# Patient Record
Sex: MALE | Race: WHITE | NOT HISPANIC OR LATINO | Employment: FULL TIME | URBAN - METROPOLITAN AREA
[De-identification: names, ages, dates, MRNs, and addresses within clinical notes are randomized per-mention and may not be internally consistent; named-entity substitution may affect disease eponyms.]

---

## 2023-10-03 PROBLEM — F43.9 SITUATIONAL STRESS: Status: ACTIVE | Noted: 2021-05-18

## 2023-10-03 PROBLEM — M25.522 LEFT ELBOW PAIN: Status: ACTIVE | Noted: 2022-01-26

## 2023-10-03 PROBLEM — F90.9 ADULT ADHD: Status: ACTIVE | Noted: 2021-05-18

## 2023-10-03 PROBLEM — M25.562 ACUTE PAIN OF LEFT KNEE: Status: ACTIVE | Noted: 2020-03-12

## 2023-10-03 RX ORDER — LANOLIN ALCOHOL/MO/W.PET/CERES
1000 CREAM (GRAM) TOPICAL DAILY
COMMUNITY
Start: 2021-05-18

## 2023-10-03 RX ORDER — ASCORBIC ACID 500 MG
500 TABLET ORAL DAILY
COMMUNITY
Start: 2021-05-18

## 2023-10-03 RX ORDER — DEXTROAMPHETAMINE SACCHARATE, AMPHETAMINE ASPARTATE MONOHYDRATE, DEXTROAMPHETAMINE SULFATE, AMPHETAMINE SULFATE 9.375; 9.375; 9.375; 9.375 MG/1; MG/1; MG/1; MG/1
37.5 CAPSULE, EXTENDED RELEASE ORAL EVERY MORNING
COMMUNITY
Start: 2022-01-26

## 2023-10-09 ENCOUNTER — TELEMEDICINE (OUTPATIENT)
Dept: PRIMARY CARE | Facility: CLINIC | Age: 33
End: 2023-10-09
Payer: COMMERCIAL

## 2023-10-09 DIAGNOSIS — F90.0 ATTENTION DEFICIT HYPERACTIVITY DISORDER (ADHD), PREDOMINANTLY INATTENTIVE TYPE: Primary | ICD-10-CM

## 2023-10-09 PROCEDURE — 99213 OFFICE O/P EST LOW 20 MIN: CPT | Performed by: FAMILY MEDICINE

## 2023-10-09 RX ORDER — DEXTROAMPHETAMINE SACCHARATE, AMPHETAMINE ASPARTATE MONOHYDRATE, DEXTROAMPHETAMINE SULFATE AND AMPHETAMINE SULFATE 5; 5; 5; 5 MG/1; MG/1; MG/1; MG/1
20 CAPSULE, EXTENDED RELEASE ORAL EVERY MORNING
Qty: 30 CAPSULE | Refills: 0 | Status: SHIPPED | OUTPATIENT
Start: 2023-12-08 | End: 2024-01-07

## 2023-10-09 RX ORDER — DEXTROAMPHETAMINE SACCHARATE, AMPHETAMINE ASPARTATE MONOHYDRATE, DEXTROAMPHETAMINE SULFATE AND AMPHETAMINE SULFATE 5; 5; 5; 5 MG/1; MG/1; MG/1; MG/1
20 CAPSULE, EXTENDED RELEASE ORAL EVERY MORNING
Qty: 30 CAPSULE | Refills: 0 | Status: SHIPPED | OUTPATIENT
Start: 2023-11-08 | End: 2023-12-08

## 2023-10-09 RX ORDER — DEXTROAMPHETAMINE SACCHARATE, AMPHETAMINE ASPARTATE MONOHYDRATE, DEXTROAMPHETAMINE SULFATE AND AMPHETAMINE SULFATE 5; 5; 5; 5 MG/1; MG/1; MG/1; MG/1
20 CAPSULE, EXTENDED RELEASE ORAL EVERY MORNING
Qty: 30 CAPSULE | Refills: 0 | Status: SHIPPED | OUTPATIENT
Start: 2023-10-09 | End: 2023-11-08

## 2023-10-09 ASSESSMENT — ENCOUNTER SYMPTOMS
ABDOMINAL PAIN: 0
COUGH: 0
UNEXPECTED WEIGHT CHANGE: 0
HEADACHES: 0
ARTHRALGIAS: 0
AGITATION: 0
SHORTNESS OF BREATH: 0
CHEST TIGHTNESS: 0
WEAKNESS: 0

## 2023-10-09 NOTE — PROGRESS NOTES
Subjective   Patient ID: Peter Masterson is a 32 y.o. male who presents for No chief complaint on file..  HPI  Pt is calling on video telehealth to report on the new med for his focus.  He had been on Mydayvis but switched to Adderall Xr 20.  He is happy with the focus.  Sleep and appetite fine   no questions      Review of Systems   Constitutional:  Negative for unexpected weight change.   Respiratory:  Negative for cough, chest tightness and shortness of breath.    Cardiovascular:  Negative for chest pain.   Gastrointestinal:  Negative for abdominal pain.   Musculoskeletal:  Negative for arthralgias.   Skin:  Negative for rash.   Neurological:  Negative for weakness and headaches.   Psychiatric/Behavioral:  Negative for agitation.        Objective   Physical Exam  No exam/telehealth    Assessment/Plan   Diagnoses and all orders for this visit:  Attention deficit hyperactivity disorder (ADHD), predominantly inattentive type  -     amphetamine-dextroamphetamine XR (Adderall XR) 20 mg 24 hr capsule; Take 1 capsule (20 mg) by mouth once daily in the morning. Do not crush or chew.  -     amphetamine-dextroamphetamine XR (Adderall XR) 20 mg 24 hr capsule; Take 1 capsule (20 mg) by mouth once daily in the morning. Do not crush or chew. Do not start before November 8, 2023.  -     amphetamine-dextroamphetamine XR (Adderall XR) 20 mg 24 hr capsule; Take 1 capsule (20 mg) by mouth once daily in the morning. Do not crush or chew. Do not start before December 8, 2023.

## 2024-01-10 ASSESSMENT — ENCOUNTER SYMPTOMS
CONSTITUTIONAL NEGATIVE: 1
RESPIRATORY NEGATIVE: 1
CARDIOVASCULAR NEGATIVE: 1

## 2024-01-10 NOTE — PATIENT INSTRUCTIONS
May use PRICE therapy as needed    Start into Physical Therapy 1-2 times a week for 8-10 weeks with manual therapy as well as dry needling and IASTM    Stressed the importance of wearing shoes with good stability control to help with the biomechanics affecting the lower legs    Stressed the importance of wearing full foot insoles to help with the biomechanics affecting the lower legs    Recommendation over-the-counter calcium with vitamin-D 2 -3000+ milligrams a day, as well as OTC symphytum as directed daily to promote bony healing, in addition to a daily multivitamin.    Recommendation over-the-counter Move Free for joint health.    May take OTC Tylenol Extra Strength or OTC Tylenol Arthritis, taking one every 6-8 hours with food as needed for pain management.  Patient advised regarding the risks and/or potential adverse reactions and/or side effects of any prescribed medications along with any over-the-counter medications or any supplements used. Patient advised to seek immediate medical care if any adverse reactions occur. The patient and/or patient(s) parent(s) verbalized their understanding.    Discussed in detail with the patient to the level of their understanding the possibility in the future of MRI OF LEFT ankle to rule out ligament or tendon injury versus stress fracture versus other   Discussed in detail with the patient to the level of their understanding the possibility in the future of regenerative injections versus corticosteroid injections     Patient was given a SHORT WALKING BOOT brace for their LEFT FOOT injury/condition. The patient is ambulatory with or without aid and feels more stable with the brace on. The brace definitely helps improve their function as well as stability. Verbal and written instructions for the use, wear schedule, cleaning, and application of this brace were given. Patient was instructed that should the brace result in increased pain, decreased sensation, numbness and/or  tingling, increased swelling, or an overall worsening of their medical condition; to please contact our office immediately. Orthotic/brace management and training was provided for skin care, modifications due to healing tissues, edema changes, interruption in skin integrity, and safety precautions with the Orthosis/brace.    Follow up 2 weeks

## 2024-01-10 NOTE — PROGRESS NOTES
"New patient  History Of Present Illness  Peter Masterson is a 33 y.o. male presenting with LEFT ankle pain. 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 Pt states that for the last month, he has had a constant pain in his foot/ankle. Unknown trauma or injury of recent. He notes previous history of sprains when he was a child. Pain located on the talus of the ankle/foot and medially in the ankle/foot. Pain with plantar flexion and eversion that is worse with walking. Denies numbness or tingling. Rates current pain as a 2/10 describing it as constant dull. At it's worst, typically when walking rates pain as a 7/10 describing it as a constant sharp.    Past Medical History  He has no past medical history on file.    Surgical History  He has a past surgical history that includes Appendectomy.     Social History  He reports that he has never smoked. He has never used smokeless tobacco. He reports current alcohol use. He reports that he does not currently use drugs after having used the following drugs: Marijuana.    Family History  Family History   Problem Relation Name Age of Onset    No Known Problems Mother      No Known Problems Father      No Known Problems Brother          Allergies  Patient has no known allergies.    Review of Systems  Review of Systems   Constitutional: Negative.    Respiratory: Negative.     Cardiovascular: Negative.    Musculoskeletal:  Positive for arthralgias and myalgias.        Joint pain   All other systems reviewed and are negative.       Last Recorded Vitals  /88 (BP Location: Right arm, Patient Position: Sitting, BP Cuff Size: Adult)   Pulse 71   Ht 1.702 m (5' 7\")   Wt 73.9 kg (163 lb)   BMI 25.53 kg/m²      Examination  Left    Edema: Negative.  Ecchymosis/Bruising: Negative.  Percussion Test: Positive   Tuning Fork Test: Negative.    Orientation:    Negative.    ROM:   Positive, Decreased Plantarflexion in combination with inversion, Plantarflexion in " combination with eversion,            Muscle Strength:   Positive +4/+5 Planar Flexion, Decreased due to pain   Positive +4/+5 Dorsi Flexion, Decreased due to pain           Positive +4/+5 Inversion, Decreased due to pain   Positive +4/+5 Eversion, Decreased due to pain        Positive +4/+5 Plantarflexion in combination with inversion, Decreased due to pain  Positive +4/+5 Plantarflexion in combination with eversion, Decreased due to pain          Positive +4/+5 Dorsiflexion in combination with inversion (Posterior Tibialis), Decreased due to pain   Positive +4/+5 Dorsiflexion in combination with eversion (Peroneal Brevis), Decreased due to pain   Positive +4/+5 Dorsiflexion in combination with eversion and flexion of great toe (Peroneal Longus), Decreased due to pain            Palpation:   Positive , Painful, Tender to Palpation over the talonavicular/anterior deltoid ligaments.            Vascular:   +2/+4 Dorsalis Pedis  +2/+4 Posterior Tibialis  Capillary Refill < 2 Seconds.     Leg/Ankle/Foot - Ankle Impingement:  Posterior Ankle Impingement Test: Negative.   Anterior Ankle Impingement Test: Negative.            Leg/Ankle/Foot - Ankle Instability:  Flexibility Test:  Negative.  Anterior Drawer Test:  Negative.  Talar Tilt Test:  Positive   External Rotation Kleiger Plantar Flexion Test:  Negative.  External Rotation Kleiger Dorsiflexion Test:  Negative.  Squeeze Test:  Positive,   Dorsiflexion Test:  Positive   Posterior Tibial Instability Test: Negative.  Peroneal Tendon Instability Test:  Negative.  Horizontal Squeeze Test:  Negative.  Vertical Squeeze Test:  Negative.   Plantarflexion:  Negative.  Standing/Walking Test:  Positive, Painful.   Tibial Torsion Test:  Positive       Leg/Ankle/Foot - DVT:  Héctor's Sign: Negative.            Leg/Ankle/Foot - Forefoot:  Strunsky Test:  Negative.   Gaenslen Maneuver Test:  Negative.   Metatarsal Tap Test:  Negative.   Crepitation Test:  Negative.          Leg/Ankle/Foot - Hindfoot Achilles/Calcaneus:  Dill Compression Test:  Negative.   Hoffa Sign:  Negative.   Achilles Tendon Tap Test:  Negative.   Heel Compression Test:  Negative.   Heel Thump Test:  Positive        Feet/Foot:   Positive  Valgus foot       Imaging and Diagnostics Review:  XRAYs ordered during todays visit    Assessment   1. Left foot pain    2. Foot sprain, left, initial encounter    3. Left ankle pain, unspecified chronicity        Plan   Treatment or Intervention:  May use PRICE therapy as needed    Start into Physical Therapy 1-2 times a week for 8-10 weeks with manual therapy as well as dry needling and IASTM    Stressed the importance of wearing shoes with good stability control to help with the biomechanics affecting the lower legs    Stressed the importance of wearing full foot insoles to help with the biomechanics affecting the lower legs    Recommendation over-the-counter calcium with vitamin-D 2 -3000+ milligrams a day, as well as OTC symphytum as directed daily to promote bony healing, in addition to a daily multivitamin.    Recommendation over-the-counter Move Free for joint health.    May take OTC Tylenol Extra Strength or OTC Tylenol Arthritis, taking one every 6-8 hours with food as needed for pain management.  Patient advised regarding the risks and/or potential adverse reactions and/or side effects of any prescribed medications along with any over-the-counter medications or any supplements used. Patient advised to seek immediate medical care if any adverse reactions occur. The patient and/or patient(s) parent(s) verbalized their understanding.    Discussed in detail with the patient to the level of their understanding the possibility in the future of MRI OF LEFT ankle to rule out ligament or tendon injury versus stress fracture versus other   Discussed in detail with the patient to the level of their understanding the possibility in the future of regenerative injections  versus corticosteroid injections   Patient was given a SHORT WALKING BOOT brace for their LEFT FOOT injury/condition. The patient is ambulatory with or without aid and feels more stable with the brace on. The brace definitely helps improve their function as well as stability. Verbal and written instructions for the use, wear schedule, cleaning, and application of this brace were given. Patient was instructed that should the brace result in increased pain, decreased sensation, numbness and/or tingling, increased swelling, or an overall worsening of their medical condition; to please contact our office immediately. Orthotic/brace management and training was provided for skin care, modifications due to healing tissues, edema changes, interruption in skin integrity, and safety precautions with the Orthosis/brace.         Diagnostic studies:    Interpreted By:  Colby Wang,   STUDY:  XR FOOT LEFT 3+ VIEWS      INDICATION:  Signs/Symptoms:LEFT FOOT PAIN      COMPARISON:  None.      ACCESSION NUMBER(S):  [id number]      ORDERING CLINICIAN:  KERRI DUVAL      TECHNIQUE:  Views:  AP, Lateral, Oblique      FINDINGS:  RESULT: Left foot: There is no evidence for fracture or dislocation.  No bone lesion or soft tissue abnormality is identified.      IMPRESSION:  Unremarkable exam          Signed by: Colby Wang 1/11/2024 9:22 AM  Dictation workstation:   [id number]  Activity Instructions, Restrictions, and Accommodations:      Consultations/Referrals:  Physical therapy    Follow-up:  Follow up 2 weeks    ELIZABETH BEE on 1/11/24 at 8:15 AM.     Kerri Duval CNP

## 2024-01-11 ENCOUNTER — OFFICE VISIT (OUTPATIENT)
Dept: SPORTS MEDICINE | Facility: CLINIC | Age: 34
End: 2024-01-11
Payer: COMMERCIAL

## 2024-01-11 ENCOUNTER — ANCILLARY PROCEDURE (OUTPATIENT)
Dept: RADIOLOGY | Facility: CLINIC | Age: 34
End: 2024-01-11
Payer: COMMERCIAL

## 2024-01-11 VITALS
HEART RATE: 71 BPM | BODY MASS INDEX: 25.58 KG/M2 | WEIGHT: 163 LBS | HEIGHT: 67 IN | DIASTOLIC BLOOD PRESSURE: 88 MMHG | SYSTOLIC BLOOD PRESSURE: 132 MMHG

## 2024-01-11 DIAGNOSIS — S93.602A FOOT SPRAIN, LEFT, INITIAL ENCOUNTER: ICD-10-CM

## 2024-01-11 DIAGNOSIS — M79.672 LEFT FOOT PAIN: ICD-10-CM

## 2024-01-11 DIAGNOSIS — M25.572 LEFT ANKLE PAIN, UNSPECIFIED CHRONICITY: ICD-10-CM

## 2024-01-11 PROCEDURE — 73630 X-RAY EXAM OF FOOT: CPT | Mod: LT

## 2024-01-11 PROCEDURE — 29515 APPLICATION SHORT LEG SPLINT: CPT | Performed by: NURSE PRACTITIONER

## 2024-01-11 PROCEDURE — 99214 OFFICE O/P EST MOD 30 MIN: CPT | Performed by: NURSE PRACTITIONER

## 2024-01-11 PROCEDURE — 1036F TOBACCO NON-USER: CPT | Performed by: NURSE PRACTITIONER

## 2024-01-11 RX ORDER — DEXTROAMPHETAMINE SACCHARATE, AMPHETAMINE ASPARTATE MONOHYDRATE, DEXTROAMPHETAMINE SULFATE AND AMPHETAMINE SULFATE 5; 5; 5; 5 MG/1; MG/1; MG/1; MG/1
20 CAPSULE, EXTENDED RELEASE ORAL EVERY MORNING
COMMUNITY

## 2024-01-11 ASSESSMENT — PATIENT HEALTH QUESTIONNAIRE - PHQ9
2. FEELING DOWN, DEPRESSED OR HOPELESS: NOT AT ALL
SUM OF ALL RESPONSES TO PHQ9 QUESTIONS 1 AND 2: 0
1. LITTLE INTEREST OR PLEASURE IN DOING THINGS: NOT AT ALL

## 2024-01-11 ASSESSMENT — ENCOUNTER SYMPTOMS
LOSS OF SENSATION IN FEET: 0
OCCASIONAL FEELINGS OF UNSTEADINESS: 0
DEPRESSION: 0
ARTHRALGIAS: 1
MYALGIAS: 1

## 2024-01-11 ASSESSMENT — PAIN - FUNCTIONAL ASSESSMENT: PAIN_FUNCTIONAL_ASSESSMENT: 0-10

## 2024-01-11 ASSESSMENT — PAIN SCALES - GENERAL: PAINLEVEL_OUTOF10: 2

## 2024-01-11 ASSESSMENT — PAIN DESCRIPTION - DESCRIPTORS: DESCRIPTORS: DISCOMFORT;ACHING;DULL

## 2024-01-11 NOTE — LETTER
January 11, 2024     Patient: Peter Masterson   YOB: 1990   Date of Visit: 1/11/2024       To Whom It May Concern:    It is my medical opinion that Peter Masterson that he is cleared to work as long as he is able to wear walking boot for support.    If you have any questions or concerns, please don't hesitate to call.         Sincerely,        Patrice Duval, YOMI-CNP    CC: No Recipients

## 2024-01-24 ENCOUNTER — OFFICE VISIT (OUTPATIENT)
Dept: SPORTS MEDICINE | Facility: CLINIC | Age: 34
End: 2024-01-24
Payer: COMMERCIAL

## 2024-01-24 VITALS
BODY MASS INDEX: 25.58 KG/M2 | HEART RATE: 71 BPM | WEIGHT: 163 LBS | HEIGHT: 67 IN | SYSTOLIC BLOOD PRESSURE: 128 MMHG | DIASTOLIC BLOOD PRESSURE: 74 MMHG

## 2024-01-24 DIAGNOSIS — S93.602D FOOT SPRAIN, LEFT, SUBSEQUENT ENCOUNTER: ICD-10-CM

## 2024-01-24 DIAGNOSIS — M25.572 LEFT ANKLE PAIN, UNSPECIFIED CHRONICITY: ICD-10-CM

## 2024-01-24 DIAGNOSIS — M79.672 LEFT FOOT PAIN: ICD-10-CM

## 2024-01-24 PROCEDURE — 99214 OFFICE O/P EST MOD 30 MIN: CPT | Performed by: NURSE PRACTITIONER

## 2024-01-24 PROCEDURE — 1036F TOBACCO NON-USER: CPT | Performed by: NURSE PRACTITIONER

## 2024-01-24 ASSESSMENT — PAIN - FUNCTIONAL ASSESSMENT: PAIN_FUNCTIONAL_ASSESSMENT: 0-10

## 2024-01-24 ASSESSMENT — ENCOUNTER SYMPTOMS
ARTHRALGIAS: 1
LOSS OF SENSATION IN FEET: 0
OCCASIONAL FEELINGS OF UNSTEADINESS: 0
CONSTITUTIONAL NEGATIVE: 1
MYALGIAS: 1
DEPRESSION: 0
CARDIOVASCULAR NEGATIVE: 1
RESPIRATORY NEGATIVE: 1

## 2024-01-24 ASSESSMENT — PATIENT HEALTH QUESTIONNAIRE - PHQ9
SUM OF ALL RESPONSES TO PHQ9 QUESTIONS 1 AND 2: 0
2. FEELING DOWN, DEPRESSED OR HOPELESS: NOT AT ALL
1. LITTLE INTEREST OR PLEASURE IN DOING THINGS: NOT AT ALL

## 2024-01-24 ASSESSMENT — PAIN SCALES - GENERAL: PAINLEVEL_OUTOF10: 0 - NO PAIN

## 2024-01-24 NOTE — PATIENT INSTRUCTIONS
May use PRICE therapy as needed    Start and continue Physical Therapy 1-2 times a week for 8-10 weeks with manual therapy as well as dry needling and IASTM    Again stressed the importance of wearing shoes with good stability control to help with the biomechanics affecting the lower legs    Again stressed the importance of wearing full foot insoles to help with the biomechanics affecting the lower legs    Recommendation over-the-counter calcium with vitamin-D 2 -3000+ milligrams a day, as well as OTC symphytum as directed daily to promote bony healing, in addition to a daily multivitamin.    Recommendation over-the-counter Move Free for joint health.    May take OTC Tylenol Extra Strength or OTC Tylenol Arthritis, taking one every 6-8 hours with food as needed for pain management.  Patient advised regarding the risks and/or potential adverse reactions and/or side effects of any prescribed medications along with any over-the-counter medications or any supplements used. Patient advised to seek immediate medical care if any adverse reactions occur. The patient and/or patient(s) parent(s) verbalized their understanding.    Discussed in detail with the patient to the level of their understanding the possibility in the future of MRI OF LEFT ankle to rule out ligament or tendon injury versus stress fracture versus other   Discussed in detail with the patient to the level of their understanding the possibility in the future of regenerative injections versus corticosteroid injections   Patient to continue SHORT WALKING BOOT brace for their LEFT FOOT injury/condition but slowly transition in to supportive shoes and inserts  Follow up 4-6 weeks

## 2024-01-24 NOTE — PROGRESS NOTES
Established patient  History Of Present Illness  Peter Masterson is a 33 y.o. male presenting for follow up of his LEFT ankle/foot. Pt states that he is feeling improved since previous visit in office. He presents today wearing walking boot that he was previously given. He has mostly been staying off his feet due to not being able to work with boot. He has had to take short term disability due to it. Rates current pain as a 0/10 but states that it can get up to a 2/10 if he is up moving without the boot. Pt is scheduled to start physical therapy on 2/2.  We discussed treatment options.  Patient has shown improvement with use of his boot.  Upon exam patient shows significant improvement.  After discussion patient will continue with his walking boot and will slowly transition into a regular shoe with proper arch support as we discussed.  If patient able to tolerate regular shoe with no significant worsening of pain he may transition into regular shoe and out of boot.  Otherwise patient will continue with walking boot until follow-up appointment and we can discuss at that time.  Patient verbalizes understanding and agreement with plan of care.    Past Medical History  He has no past medical history on file.    Surgical History  He has a past surgical history that includes Appendectomy.     Social History  He reports that he has never smoked. He has never used smokeless tobacco. He reports current alcohol use. He reports that he does not currently use drugs after having used the following drugs: Marijuana.    Family History  Family History   Problem Relation Name Age of Onset    No Known Problems Mother      No Known Problems Father      No Known Problems Brother          Allergies  Patient has no known allergies.    Review of Systems  Review of Systems   Constitutional: Negative.    Respiratory: Negative.     Cardiovascular: Negative.    Musculoskeletal:  Positive for arthralgias and myalgias.   All other systems  "reviewed and are negative.       Last Recorded Vitals  /74 (BP Location: Right arm, Patient Position: Sitting, BP Cuff Size: Adult)   Pulse 71   Ht 1.702 m (5' 7\")   Wt 73.9 kg (163 lb)   BMI 25.53 kg/m²      Examination  Left    Edema: Negative.  Ecchymosis/Bruising: Negative.  Percussion Test: Negative.  Tuning Fork Test: Negative.     Orientation:    Negative.     ROM:   Negative.           Muscle Strength:   +5/+5 Planar Flexion,  +5/+5 Dorsi Flexion,          +5/+5 Inversion,   +5/+5 Eversion,         +5/+5 Plantarflexion in combination with inversion,   +5/+5 Plantarflexion in combination with eversion,           +5/+5 Dorsiflexion in combination with inversion (Posterior Tibialis),  +5/+5 Dorsiflexion in combination with eversion (Peroneal Brevis),    +5/+5 Dorsiflexion in combination with eversion and flexion of great toe (Peroneal Longus),            Palpation:   Positive , Painful, Tender to Palpation over the talonavicular/anterior deltoid ligaments.            Vascular:   +2/+4 Dorsalis Pedis  +2/+4 Posterior Tibialis  Capillary Refill < 2 Seconds.      Leg/Ankle/Foot - Ankle Impingement:  Posterior Ankle Impingement Test: Negative.   Anterior Ankle Impingement Test: Negative.            Leg/Ankle/Foot - Ankle Instability:  Flexibility Test:  Negative.  Anterior Drawer Test:  Negative.  Talar Tilt Test:  Negative.  External Rotation Kleiger Plantar Flexion Test:  Negative.  External Rotation Kleiger Dorsiflexion Test:  Negative.  Squeeze Test:  Negative.  Dorsiflexion Test:  Negative.  Posterior Tibial Instability Test: Negative.  Peroneal Tendon Instability Test:  Negative.  Horizontal Squeeze Test:  Negative.  Vertical Squeeze Test:  Negative.   Plantarflexion:  Negative.  Standing/Walking Test: Negative.  Tibial Torsion Test: Negative.       Leg/Ankle/Foot - DVT:  Héctor's Sign: Negative.            Leg/Ankle/Foot - Forefoot:  Strunsky Test:  Negative.   Gaenslen Maneuver Test:  Negative. "   Metatarsal Tap Test:  Negative.   Crepitation Test:  Negative.          Leg/Ankle/Foot - Hindfoot Achilles/Calcaneus:  Dill Compression Test:  Negative.   Hoffa Sign:  Negative.   Achilles Tendon Tap Test:  Negative.   Heel Compression Test:  Negative.   Heel Thump Test:  Negative.      Feet/Foot:   Positive  Valgus foot         Imaging and Diagnostics Review:  No new radiographs were obtained today.    Assessment   1. Left foot pain    2. Left ankle pain, unspecified chronicity    3. Foot sprain, left, subsequent encounter        Plan   Treatment or Intervention:  May use PRICE therapy as needed    Start and continue Physical Therapy 1-2 times a week for 8-10 weeks with manual therapy as well as dry needling and IASTM    Again stressed the importance of wearing shoes with good stability control to help with the biomechanics affecting the lower legs    Again stressed the importance of wearing full foot insoles to help with the biomechanics affecting the lower legs    Recommendation over-the-counter calcium with vitamin-D 2 -3000+ milligrams a day, as well as OTC symphytum as directed daily to promote bony healing, in addition to a daily multivitamin.    Recommendation over-the-counter Move Free for joint health.    May take OTC Tylenol Extra Strength or OTC Tylenol Arthritis, taking one every 6-8 hours with food as needed for pain management.  Patient advised regarding the risks and/or potential adverse reactions and/or side effects of any prescribed medications along with any over-the-counter medications or any supplements used. Patient advised to seek immediate medical care if any adverse reactions occur. The patient and/or patient(s) parent(s) verbalized their understanding.    Discussed in detail with the patient to the level of their understanding the possibility in the future of MRI OF LEFT ankle to rule out ligament or tendon injury versus stress fracture versus other   Discussed in detail with the  patient to the level of their understanding the possibility in the future of regenerative injections versus corticosteroid injections   Patient to continue SHORT WALKING BOOT brace for their LEFT FOOT injury/condition but slowly transition in to supportive shoes and inserts    Diagnostic studies:  XR foot left 3+ views  Narrative: Interpreted By:  Colby Wang,   STUDY:  XR FOOT LEFT 3+ VIEWS      INDICATION:  Signs/Symptoms:LEFT FOOT PAIN      COMPARISON:  None.      ACCESSION NUMBER(S):  [id number]      ORDERING CLINICIAN:  KERRI DUVAL      TECHNIQUE:  Views:  AP, Lateral, Oblique      FINDINGS:  RESULT: Left foot: There is no evidence for fracture or dislocation.  No bone lesion or soft tissue abnormality is identified.      Impression: Unremarkable exam          Signed by: Colby Wang 1/11/2024 9:22 AM  Dictation workstation:   [id number]       Activity Instructions, Restrictions, and Accommodations:  Cleared to return to work on 2/2    Consultations/Referrals:  Physical therapy    Follow-up:  Follow up 4-6 weeks    ELIZABETH BEE on 1/24/24 at 12:51 PM.     Kerri Duval CNP

## 2024-01-24 NOTE — LETTER
January 25, 2024     Patient: Peter Masterson   YOB: 1990   Date of Visit: 1/24/2024       To Whom It May Concern:    It is my medical opinion that Peter Masterson is cleared to return to work on 2/2 with no restrictions.    If you have any questions or concerns, please don't hesitate to call.         Sincerely,        Patrice Duval, YOMI-CNP    CC: No Recipients

## 2024-01-31 NOTE — PROGRESS NOTES
Physical Therapy Evaluation/Treatment    Patient Name: Peter Masterson  MRN: [id number]  Encounter Date: 2/2/2024       Visit Number:  1 / ***   Visit Authorized:  ***  Progress Report to be done at:  visit #***    Current Problem:  1. Left foot pain        2. Foot sprain, left, subsequent encounter        3. Left ankle pain            Surgery:  {NA:85819}    Surgery Date:  {NA:40184}    Subjective:  Subjective     SUBJECTIVE:  Main complaint:  ***  Onset Date:   ***  Date of Injury:  ***    Patient reported hx of injury:   ***    Previous Medical Treatment:    ***    Diagnostics:  {BASDiagnostics:78087}  ***    XR foot left 3+ views    Result Date: 1/11/2024  Interpreted By:  Colby Wang, STUDY: XR FOOT LEFT 3+ VIEWS   INDICATION: Signs/Symptoms:LEFT FOOT PAIN   COMPARISON: None.   ACCESSION NUMBER(S): [id number]   ORDERING CLINICIAN: KERRI LA   TECHNIQUE: Views:  AP, Lateral, Oblique   FINDINGS: RESULT: Left foot: There is no evidence for fracture or dislocation. No bone lesion or soft tissue abnormality is identified.       Unremarkable exam     Signed by: Colby Wang 1/11/2024 9:22 AM Dictation workstation:   [id number]      Previous Therapy Treatments:    {Previous PT services:26061}  Successful:  {yes/no:50199}  ***  ***    Pt stated Goal:  ***    General:       Precautions:       Pain:       Home Living:       Home type: {dkhousetype:37776}  Stairs: {yes,no:09923}  Lives with: {dkliveswith:69208}    Vocation:    {BASVocation:51055}  Job/Job tasks:  ***    Prior Function Per Pt/Caregiver Report:       OBJECTIVE:  ***    Posture:       Posture:     ROM and Strength:  {CorneliongMacyOMwnlwflseebelow:65024}       AROM Strength   Hip R L R L   Hip Flexion *** *** ***/5 ***/5   Hip Abduction *** *** ***/5 ***/5   Hip Adduction *** *** ***/5 ***/5        AROM STRENGTH   Knee R L R L   Knee Flexion *** *** ***/5 ***/5   Knee Extension *** *** ***/5 ***/5     Ankle:  {JOSE WFL:89862}     AROM STRENGTH    Ankle R L R L   Dorsiflexion *** *** ***/5 ***/5   Plantarflexion *** *** ***/5 ***/5   Eversion *** *** ***/5 ***/5   Inversion *** *** ***/5 ***/5        Flexibility:       R  L   Piriformis *** ***   TREVIN *** ***   Hip flexor *** ***   Iliotibial band *** ***   Quadriceps *** ***   Hamstring *** ***   Gastroc/Soleus *** ***             Palpation:       Palpation:       Gait:       Balance:       Stairs:       Transfers:       Outcome Measures:  {PT Outcome Measures:07369}     Assessment       Pt is a 33 y.o. male who presents with impairments of ***.  Pt would benefit from skilled physical therapy to improve flexibility, ROM, strength to reduce pain and improve the patient's current level of functioning including routine exercise program.  Pt would also benefit from proper body mechanics and ergonomic training to better manage the patient's symptoms and reduce exacerbation.      Pt's recovery time and progress/outcomes will likely be impacted by the patient;s history of *** and ***.    Plan       OP EDUCATION:         Pt ed:  anatomy of *** and how it impacts current symptoms, initiated [id number]-[id number] (sit and or sleep with towel roll), sit tall and stand tall posturing for ADLs, IADLs and performing activities at home; rationale for PT POC with respect to exercises improved ROM, strength, posture and reducing pain; how each modality will address soft tissue restrictions/issues, pain and ROM.  Pt educated in how to determine pain patterns and worsening pain versus muscle soreness. Pt instructed to stop any exercises (or activity) that worsens her pain and we'll discuss next visit.    Transfer training to decrease back pain when standing:  sit tall, scoot forward, push upwards with arms while maintaining tall posture (decrease forward flexion when standing)    HEP to be completed daily, exercises include:  ***    Goals:      Treatments this date:  {PT Treatments:68841}    Perform HEP as instructed and  according to handouts.  Monitor pain levels, stop any exercises that increases pain level and report to therapist next visit.      Billed Treatment Times:  {Treatement times:33722}    Therapeutic modalities this date:       Billed Treatment Times:  {Treatement times:10982}    Therapeutic Activities:  {Activity:81417}    Billed Treatment Times:  {Treatement times:79008}    Manual:    ***  Billed Treatment Times:  {Treatement times:06975}    Balance/NMRE:     ***    Billed Treatment Times:  {Treatement times:64333}    Plan for next visit:  ***

## 2024-02-01 NOTE — PROGRESS NOTES
Physical Therapy Evaluation/Treatment    Patient Name: Peter Masterson  MRN: [id number]  Encounter Date: 2/9/2024  Time Calculation  Start Time: 0816  Stop Time: 0900  Time Calculation (min): 44 min    Visit Number:  1/10  Planned total visits: 10  Visit Authorized/insurance coverage:  $1500 DED (NM)/ 80% COVERAGE/ NO AUTH/ 60 VISITS/ PER [employer] REF# [id number]  Progress Report due visit #10      See blue Post It note    Current Problem:  1. Left foot pain  Referral to Physical Therapy    Follow Up In Physical Therapy      2. Left ankle pain, unspecified chronicity  Referral to Physical Therapy    Follow Up In Physical Therapy      3. Foot sprain, left, subsequent encounter  Follow Up In Physical Therapy      4. Foot sprain, left, initial encounter  Referral to Physical Therapy    Follow Up In Physical Therapy          Subjective:  Subjective     SUBJECTIVE:  Main complaint:  left ankle pain that occurs at the end of his shift and after 8 to 10 hours of standing  Onset Date:   Early December, Rx date 01/11/2024  Date of Injury:  NA    Patient reported hx of injury:   Pt rolled his left ankle when at age 14, healed without medical intervention; however, pt reports it may have taken over 6 months    Previous Medical Treatment:    XR  PT ordered  Air cast boot - worn four weeks, F/U with Doctor and pt was able to remove the boot after 3-4 weeks.  He reports the boot helped.  He is no longer wearing the boot and reports pain worsened a little bit and requests another type of brace to be worn when standing for prolonged periods of time    Diagnostics:  X Ray    XR foot left 3+ views    Result Date: 1/11/2024  Interpreted By:  Colby Wang, STUDY: XR FOOT LEFT 3+ VIEWS   INDICATION: Signs/Symptoms:LEFT FOOT PAIN   COMPARISON: None.   ACCESSION NUMBER(S): [id number]   ORDERING CLINICIAN: KERRI LA   TECHNIQUE: Views:  AP, Lateral, Oblique       FINDINGS: RESULT: Left foot: There is no evidence for fracture or  dislocation. No bone lesion or soft tissue abnormality is identified.       Unremarkable exam     Signed by: Colby Wang 1/11/2024 9:22 AM Dictation workstation:   [id number]      Previous Therapy Treatments:    N/A    Pt stated Goal:  Strengthen his ankle and be able to snow board without re-injuring his ankle.  Wants to be able to stand 8-10 hours without ankle pain at the end of his shift      General:  General  Reason for Referral: left ankle pain  General Comment: Early December his left ankle became painful.  Pt is unaware of the cause.  Symptoms weren't improving.  Assessed by Sports Medicine in January 2024    Precautions:  Precautions  Precautions Comment: Pt reports he was not given any specifics on what he can and cannot do.    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    Prior Function Per Pt/Caregiver Report:  Level of [place]: Independent with ADLs and functional transfers, Independent with homemaking with ambulation    OBJECTIVE:    Posture:  Posture Comment: slightly forward head/rounded shoulders    Posture:     ROM and Strength:  See below    Ankle:     AROM STRENGTH   Ankle R L R L   Dorsiflexion WNL 20 5/5 4+/5   Plantarflexion hyperflexion 30 5/5 4/5   Eversion WNL WNL  End range pain 5/5 4-/5   Inversion WNL WNL   End range pain 5/5 4-/5        Flexibility:       R  L   Hamstring tight tight          Palpation:       Palpation:  Palpation Comment: TTP:  left talonavicular anterior deltoid  ligament to deep palpation with stretch and contraction    Gait:  Gait Comment: decreased selene, otherwise normal    Outcome Measures:  Other Measures  Lower Extremity Funtional Score (LEFS): 71/80 (89%)     Assessment  PT Assessment Results: Decreased strength, Decreased range of motion, Impaired balance, Decreased mobility, Pain  Rehab Prognosis: Good    Pt is a 33 y.o. male who presents with impairments of left ankle due to pain and instability.  Pt would benefit from skilled physical therapy to improve flexibility, ROM, strength to reduce pain and improve the patient's current level of functioning including routine exercise program.  Pt would also benefit from proper body mechanics and ergonomic training to better manage the patient's symptoms and reduce exacerbation.      Pt's recovery time and progress/outcomes will likely be impacted by the patient's history of pt's previous ankle injury.      Plan  Treatment/Interventions: Aquatic therapy, Dry needling, Education/ Instruction, Electrical stimulation, Manual therapy, Neuromuscular re-education, Self care/ home management, Taping techniques, Therapeutic activities, Therapeutic exercises, Ultrasound  PT Plan: Skilled PT  PT Frequency: 1 time per week  Duration: 5 weeks  Onset Date: 01/11/24  Certification Period Start Date: 02/09/24  Certification Period End Date: 05/09/24  Number of Treatments Authorized: 10  Rehab Potential: Good  Plan of Care Agreement: Patient    Goals:  Active       PT Problem- left ankle       PT Goal 1 STG       Start:  02/09/24    Expected End:  03/25/24       1.  Improve left AROM by 10 deg at deficits   2.  Improve strength of left ankle by 1/2 mm grade at deficits   3.  Pain:  0 to 1              PT Goal 2 LTG, FUNCTIONal goal       Start:  02/09/24    Expected End:  05/09/24       1.  Improve left AROM plantarflexion, inversion, eversion to WNL and no end range pain  2.  Improve strength of left ankle to 4+/5 at deficits   3.   Pain:  0   4.  Functional Outcome Measure: 95%          Patient Stated Goal 1       Start:  02/09/24    Expected End:  05/09/24       Strengthen his ankle and be able to snow board without re-injuring his ankle.  Wants to be able to stand 8-10 hours without ankle pain at the end of his shift               Treatments this date:    Billed Treatment Times:    Therapeutic Activities:  Extensive pt education on anatomy of the ankle in particular tendons and ligaments, effects of previous injuries on ligamentous structures, causes of chronic inflammation, benefits of PT modalities to reduce inflammation (US, contrast bath (HEP), dry needling), benefits of support shoes due to previous injury as well as importance in profession requiring workers to be on their feet all day, particularly in factory settings, discussed benefits of custom inserts.  Pt inquired about supplements and a different ankle support (PT to contact referring providers and ATC for recommendations). Pt given handout on dry needing for him to review; although, at the time he voiced he would like to avoid needles.      Billed Treatment Times:  Therapeutic Activity 24 min    Plan for next visit:  initiate static and progress to dynamic ankle stabilization exercises.  US and manual for inflammation

## 2024-02-02 ENCOUNTER — APPOINTMENT (OUTPATIENT)
Dept: PHYSICAL THERAPY | Facility: CLINIC | Age: 34
End: 2024-02-02
Payer: COMMERCIAL

## 2024-02-09 ENCOUNTER — EVALUATION (OUTPATIENT)
Dept: PHYSICAL THERAPY | Facility: CLINIC | Age: 34
End: 2024-02-09
Payer: COMMERCIAL

## 2024-02-09 DIAGNOSIS — M25.572 LEFT ANKLE PAIN, UNSPECIFIED CHRONICITY: ICD-10-CM

## 2024-02-09 DIAGNOSIS — M79.672 LEFT FOOT PAIN: Primary | ICD-10-CM

## 2024-02-09 DIAGNOSIS — S93.602A FOOT SPRAIN, LEFT, INITIAL ENCOUNTER: ICD-10-CM

## 2024-02-09 DIAGNOSIS — S93.602D FOOT SPRAIN, LEFT, SUBSEQUENT ENCOUNTER: ICD-10-CM

## 2024-02-09 PROCEDURE — 97530 THERAPEUTIC ACTIVITIES: CPT | Mod: GP | Performed by: PHYSICAL THERAPIST

## 2024-02-09 PROCEDURE — 97161 PT EVAL LOW COMPLEX 20 MIN: CPT | Mod: GP | Performed by: PHYSICAL THERAPIST

## 2024-02-09 ASSESSMENT — PAIN SCALES - GENERAL: PAINLEVEL_OUTOF10: 2

## 2024-02-09 ASSESSMENT — PAIN - FUNCTIONAL ASSESSMENT: PAIN_FUNCTIONAL_ASSESSMENT: 0-10

## 2024-02-09 ASSESSMENT — PAIN DESCRIPTION - DESCRIPTORS: DESCRIPTORS: DULL

## 2024-02-20 NOTE — PROGRESS NOTES
Physical Therapy Treatment    Patient Name: Peter Masterson  MRN: [id number]  Encounter date:  2/21/2024  Time Calculation  Start Time: 0816  Stop Time: 0901  Time Calculation (min): 45 min     PT Therapeutic Procedures Time Entry  Therapeutic Exercise Time Entry: 41    Visit Number:  2 (including evaluation)  Planned total visits: 10  Visit Authorized/insurance coverage:  $1500 DED (NM)/ 80% COVERAGE/ NO AUTH/ 60 VISITS/ PER [employer] REF# [id number]  Progress Report due visit #10    Current Problem  1. Left foot pain  Follow Up In Physical Therapy      2. Left ankle pain, unspecified chronicity  Follow Up In Physical Therapy      3. Foot sprain, left, subsequent encounter  Follow Up In Physical Therapy      4. Foot sprain, left, initial encounter  Follow Up In Physical Therapy        *See Blue Sticky Note* prior to treating patient    Precautions  Precautions  Precautions Comment: Pt reports he was not given any specifics on what he can and cannot do.      Pain  Pain Assessment: 0-10  Pain Score: 0 - No pain (End of work day 1-2; Post treatment:  2-3)  Pain Type: Acute pain  Pain Location: Ankle  Pain Orientation: Left  Pain Descriptors: Dull    Subjective  General  General Comment: new shoes to work has helped and pain not nearly as bad at the end of the day    PT  Visit  Response to Previous Treatment: Patient with no complaints from previous session.    Objective  Tight achiles and hs  PF weakness    Treatment:  Therapeutic Exercise  Therapeutic Exercise Performed: Yes    In treatment room:  Rocker board   Forward back/s-s 1 min eac  Seated calf stretch with sheel 2x30s  Moflex 1x30s  Seated HS stretch, seated with foot chair, LS 30s each except 2x for long sit    Strengthenthing PTB  All movements 15x      initiate static and progress to dynamic ankle stabilization exercises.  US and manual for inflammation     Billed Treatment Times:  Therapeutic Exercise 41 min    Current HEP:  The above stretches and  strengthening  OP EDUCATION:       Assessment:  PT Assessment  Assessment Comment: Pt able to perform the activities from today with slight increase in pain and some cues for proper technique  Pt's response to treatment:  slight increase in pain  Areas of improvements:  initiated HEP, less pain overall  Limitations/deficits: weakness, stability    Plan:     Continue with current POC/no changes    Assessment of current progress against goals:  Progressing toward functional goals    Goals:  Active       PT Problem- left ankle       PT Goal 1 STG       Start:  02/09/24    Expected End:  03/25/24       1.  Improve left AROM by 10 deg at deficits   2.  Improve strength of left ankle by 1/2 mm grade at deficits   3.  Pain:  0 to 1              PT Goal 2 LTG, FUNCTIONal goal       Start:  02/09/24    Expected End:  05/09/24       1.  Improve left AROM plantarflexion, inversion, eversion to WNL and no end range pain  2.  Improve strength of left ankle to 4+/5 at deficits   3.  Pain:  0   4.  Functional Outcome Measure: 95%          Patient Stated Goal 1       Start:  02/09/24    Expected End:  05/09/24       Strengthen his ankle and be able to snow board without re-injuring his ankle.  Wants to be able to stand 8-10 hours without ankle pain at the end of his shift

## 2024-02-21 ENCOUNTER — TREATMENT (OUTPATIENT)
Dept: PHYSICAL THERAPY | Facility: CLINIC | Age: 34
End: 2024-02-21
Payer: COMMERCIAL

## 2024-02-21 DIAGNOSIS — M79.672 LEFT FOOT PAIN: ICD-10-CM

## 2024-02-21 DIAGNOSIS — S93.602D FOOT SPRAIN, LEFT, SUBSEQUENT ENCOUNTER: ICD-10-CM

## 2024-02-21 DIAGNOSIS — S93.602A FOOT SPRAIN, LEFT, INITIAL ENCOUNTER: ICD-10-CM

## 2024-02-21 DIAGNOSIS — M25.572 LEFT ANKLE PAIN, UNSPECIFIED CHRONICITY: ICD-10-CM

## 2024-02-21 PROCEDURE — 97110 THERAPEUTIC EXERCISES: CPT | Mod: GP | Performed by: PHYSICAL THERAPIST

## 2024-02-21 ASSESSMENT — PAIN - FUNCTIONAL ASSESSMENT: PAIN_FUNCTIONAL_ASSESSMENT: 0-10

## 2024-02-21 ASSESSMENT — PAIN DESCRIPTION - DESCRIPTORS: DESCRIPTORS: DULL

## 2024-02-21 ASSESSMENT — PAIN SCALES - GENERAL: PAINLEVEL_OUTOF10: 0 - NO PAIN

## 2024-02-28 ENCOUNTER — APPOINTMENT (OUTPATIENT)
Dept: PHYSICAL THERAPY | Facility: CLINIC | Age: 34
End: 2024-02-28
Payer: COMMERCIAL

## 2024-02-29 NOTE — PROGRESS NOTES
Physical Therapy Treatment    Patient Name: Peter Masterson  MRN: [id number]  Encounter date:  3/4/2024             Visit Number:  Visit count could not be calculated. Make sure you are using a visit which is associated with an episode. (including evaluation)  Planned total visits: ***  Visit Authorized/insurance coverage:  ***  Progress Report due visit #***    Visit Number:  2 (including evaluation)  Planned total visits: 10  Visit Authorized/insurance coverage:  $1500 DED (NM)/ 80% COVERAGE/ NO AUTH/ 60 VISITS/ PER [employer] REF# [id number]  Progress Report due visit #10    Current Problem  No diagnosis found.    Surgery    Precautions         Pain       Subjective  General            Objective  ***    Treatment:  {PT Treatments:34903}  In treatment room:  Rocker board   Forward back/s-s 1 min eac  Seated calf stretch with sheel 2x30s  Moflex 1x30s  Seated HS stretch, seated with foot chair, LS 30s each except 2x for long sit     Strengthenthing PTB  All movements 15x       initiate static and progress to dynamic ankle stabilization exercises.  US and manual for inflammation   Billed Treatment Times:  {Treatment times:91876}    Current HEP:  ***    {PT Treatments:64379}  Manual:    ***  Billed Treatment Times:  {Treatment times:23902}      {PT Treatments:98492}  Balance/NMRE:     ***  Billed Treatment Times:  {Treatment times:22910}    {PT Treatments:35085}  Therapeutic Activity:  ***  Billed Treatment Times:  {Treatment times:23643}    OP EDUCATION:       Assessment:     Pt's response to treatment:  ***  Areas of improvements:  ***  Limitations/deficits:  ***    Pain end of session:  ***    Plan:     {BASPLAN:19088}    Assessment of current progress against goals:  {BASPTNOTEGOALASSESSMENT:62233}    Goals:

## 2024-03-04 ENCOUNTER — APPOINTMENT (OUTPATIENT)
Dept: PHYSICAL THERAPY | Facility: CLINIC | Age: 34
End: 2024-03-04
Payer: COMMERCIAL

## 2024-03-15 ENCOUNTER — APPOINTMENT (OUTPATIENT)
Dept: PHYSICAL THERAPY | Facility: CLINIC | Age: 34
End: 2024-03-15
Payer: COMMERCIAL

## 2024-03-18 ENCOUNTER — DOCUMENTATION (OUTPATIENT)
Dept: PHYSICAL THERAPY | Facility: CLINIC | Age: 34
End: 2024-03-18
Payer: COMMERCIAL

## 2024-03-18 NOTE — PROGRESS NOTES
Physical Therapy    Discharge Summary    Name: Peter Masterson  MRN: [id number]  : 1990  Date: 24    Discharge Summary: PT    Discharge Information: Date of discharge 2024    Therapy Summary: Pt seen for eval + one visit, established HEP    Discharge Status: HEP     Rehab Discharge Reason: Patient requested due to feeling he no longer needs treatment.

## 2024-03-18 NOTE — PROGRESS NOTES
Physical Therapy Treatment    Patient Name: Peter Masterson  MRN: [id number]  Encounter date:  2/2/2024             Visit Number:  Visit count could not be calculated. Make sure you are using a visit which is associated with an episode. (including evaluation)  Planned total visits: ***  Visit Authorized/insurance coverage:  ***  Progress Report due visit #***      Current Problem  No diagnosis found.    Surgery  ***    Surgery date:  ***    Precautions         Pain       Subjective  General            Objective  ***    Treatment:  {PT Treatments:28891}  ***  Billed Treatment Times:  {Treatment times:59286}    Current HEP:  ***    {PT Treatments:46449}  Manual:    ***  Billed Treatment Times:  {Treatment times:10032}      {PT Treatments:70437}  Balance/NMRE:     ***  Billed Treatment Times:  {Treatment times:94584}    {PT Treatments:99457}  Therapeutic Activity:  ***  Billed Treatment Times:  {Treatment times:28198}    OP EDUCATION:       Assessment:     Pt's response to treatment:  ***  Areas of improvements:  ***  Limitations/deficits:  ***    Pain end of session:  ***    Plan:     {BASPLAN:78559}    Assessment of current progress against goals:  {BASPTNOTEGOALASSESSMENT:73584}    Goals:

## 2024-03-20 ENCOUNTER — APPOINTMENT (OUTPATIENT)
Dept: PHYSICAL THERAPY | Facility: CLINIC | Age: 34
End: 2024-03-20
Payer: COMMERCIAL

## 2024-03-27 ENCOUNTER — APPOINTMENT (OUTPATIENT)
Dept: PHYSICAL THERAPY | Facility: CLINIC | Age: 34
End: 2024-03-27
Payer: COMMERCIAL

## 2024-04-03 ENCOUNTER — APPOINTMENT (OUTPATIENT)
Dept: PHYSICAL THERAPY | Facility: CLINIC | Age: 34
End: 2024-04-03
Payer: COMMERCIAL

## 2024-04-10 ENCOUNTER — APPOINTMENT (OUTPATIENT)
Dept: PHYSICAL THERAPY | Facility: CLINIC | Age: 34
End: 2024-04-10
Payer: COMMERCIAL

## 2024-04-17 ENCOUNTER — APPOINTMENT (OUTPATIENT)
Dept: PHYSICAL THERAPY | Facility: CLINIC | Age: 34
End: 2024-04-17
Payer: COMMERCIAL

## 2024-04-24 ENCOUNTER — APPOINTMENT (OUTPATIENT)
Dept: PHYSICAL THERAPY | Facility: CLINIC | Age: 34
End: 2024-04-24
Payer: COMMERCIAL

## 2024-10-30 ENCOUNTER — APPOINTMENT (OUTPATIENT)
Dept: PRIMARY CARE | Facility: CLINIC | Age: 34
End: 2024-10-30
Payer: COMMERCIAL

## 2024-10-30 VITALS
HEART RATE: 75 BPM | TEMPERATURE: 98.7 F | DIASTOLIC BLOOD PRESSURE: 84 MMHG | BODY MASS INDEX: 25.58 KG/M2 | WEIGHT: 163 LBS | OXYGEN SATURATION: 97 % | HEIGHT: 67 IN | SYSTOLIC BLOOD PRESSURE: 138 MMHG

## 2024-10-30 DIAGNOSIS — Z13.220 LIPID SCREENING: ICD-10-CM

## 2024-10-30 DIAGNOSIS — F32.1 CURRENT MODERATE EPISODE OF MAJOR DEPRESSIVE DISORDER WITHOUT PRIOR EPISODE (MULTI): ICD-10-CM

## 2024-10-30 DIAGNOSIS — F41.9 ANXIETY: ICD-10-CM

## 2024-10-30 DIAGNOSIS — Z23 IMMUNIZATION DUE: ICD-10-CM

## 2024-10-30 DIAGNOSIS — Z00.00 ROUTINE GENERAL MEDICAL EXAMINATION AT A HEALTH CARE FACILITY: Primary | ICD-10-CM

## 2024-10-30 PROCEDURE — 3008F BODY MASS INDEX DOCD: CPT | Performed by: STUDENT IN AN ORGANIZED HEALTH CARE EDUCATION/TRAINING PROGRAM

## 2024-10-30 PROCEDURE — 99395 PREV VISIT EST AGE 18-39: CPT | Performed by: STUDENT IN AN ORGANIZED HEALTH CARE EDUCATION/TRAINING PROGRAM

## 2024-10-30 PROCEDURE — 90715 TDAP VACCINE 7 YRS/> IM: CPT | Performed by: STUDENT IN AN ORGANIZED HEALTH CARE EDUCATION/TRAINING PROGRAM

## 2024-10-30 PROCEDURE — 1036F TOBACCO NON-USER: CPT | Performed by: STUDENT IN AN ORGANIZED HEALTH CARE EDUCATION/TRAINING PROGRAM

## 2024-10-30 PROCEDURE — 90471 IMMUNIZATION ADMIN: CPT | Performed by: STUDENT IN AN ORGANIZED HEALTH CARE EDUCATION/TRAINING PROGRAM

## 2024-10-30 RX ORDER — ESCITALOPRAM OXALATE 5 MG/1
5 TABLET ORAL DAILY
Qty: 30 TABLET | Refills: 1 | Status: SHIPPED | OUTPATIENT
Start: 2024-10-30 | End: 2025-01-28

## 2024-10-30 ASSESSMENT — ANXIETY QUESTIONNAIRES
IF YOU CHECKED OFF ANY PROBLEMS ON THIS QUESTIONNAIRE, HOW DIFFICULT HAVE THESE PROBLEMS MADE IT FOR YOU TO DO YOUR WORK, TAKE CARE OF THINGS AT HOME, OR GET ALONG WITH OTHER PEOPLE: SOMEWHAT DIFFICULT
1. FEELING NERVOUS, ANXIOUS, OR ON EDGE: NEARLY EVERY DAY
7. FEELING AFRAID AS IF SOMETHING AWFUL MIGHT HAPPEN: NOT AT ALL
4. TROUBLE RELAXING: NEARLY EVERY DAY
6. BECOMING EASILY ANNOYED OR IRRITABLE: MORE THAN HALF THE DAYS
2. NOT BEING ABLE TO STOP OR CONTROL WORRYING: NEARLY EVERY DAY
5. BEING SO RESTLESS THAT IT IS HARD TO SIT STILL: NEARLY EVERY DAY
GAD7 TOTAL SCORE: 17
3. WORRYING TOO MUCH ABOUT DIFFERENT THINGS: NEARLY EVERY DAY

## 2024-10-30 ASSESSMENT — ENCOUNTER SYMPTOMS
DYSPHORIC MOOD: 1
DYSURIA: 0
SHORTNESS OF BREATH: 0
VOMITING: 0
SLEEP DISTURBANCE: 0
FEVER: 0
POLYDIPSIA: 0
DIZZINESS: 0
ARTHRALGIAS: 0
LIGHT-HEADEDNESS: 0
WOUND: 0
COUGH: 0
SINUS PRESSURE: 0
MYALGIAS: 0
PALPITATIONS: 0
FATIGUE: 0
HEADACHES: 0
NERVOUS/ANXIOUS: 1
SINUS PAIN: 0
NAUSEA: 0
FREQUENCY: 0
DIARRHEA: 0
WHEEZING: 0
CONSTIPATION: 0
CHILLS: 0
RHINORRHEA: 0

## 2024-10-30 ASSESSMENT — PATIENT HEALTH QUESTIONNAIRE - PHQ9
2. FEELING DOWN, DEPRESSED OR HOPELESS: NOT AT ALL
6. FEELING BAD ABOUT YOURSELF - OR THAT YOU ARE A FAILURE OR HAVE LET YOURSELF OR YOUR FAMILY DOWN: MORE THAN HALF THE DAYS
9. THOUGHTS THAT YOU WOULD BE BETTER OFF DEAD, OR OF HURTING YOURSELF: NOT AT ALL
SUM OF ALL RESPONSES TO PHQ9 QUESTIONS 1 AND 2: 5
3. TROUBLE FALLING OR STAYING ASLEEP OR SLEEPING TOO MUCH: NEARLY EVERY DAY
1. LITTLE INTEREST OR PLEASURE IN DOING THINGS: NEARLY EVERY DAY
5. POOR APPETITE OR OVEREATING: NOT AT ALL
SUM OF ALL RESPONSES TO PHQ QUESTIONS 1-9: 15
7. TROUBLE CONCENTRATING ON THINGS, SUCH AS READING THE NEWSPAPER OR WATCHING TELEVISION: MORE THAN HALF THE DAYS
10. IF YOU CHECKED OFF ANY PROBLEMS, HOW DIFFICULT HAVE THESE PROBLEMS MADE IT FOR YOU TO DO YOUR WORK, TAKE CARE OF THINGS AT HOME, OR GET ALONG WITH OTHER PEOPLE: VERY DIFFICULT
SUM OF ALL RESPONSES TO PHQ9 QUESTIONS 1 AND 2: 0
1. LITTLE INTEREST OR PLEASURE IN DOING THINGS: NOT AT ALL
8. MOVING OR SPEAKING SO SLOWLY THAT OTHER PEOPLE COULD HAVE NOTICED. OR THE OPPOSITE, BEING SO FIGETY OR RESTLESS THAT YOU HAVE BEEN MOVING AROUND A LOT MORE THAN USUAL: SEVERAL DAYS
2. FEELING DOWN, DEPRESSED OR HOPELESS: MORE THAN HALF THE DAYS
4. FEELING TIRED OR HAVING LITTLE ENERGY: MORE THAN HALF THE DAYS

## 2024-10-30 ASSESSMENT — PAIN SCALES - GENERAL: PAINLEVEL_OUTOF10: 0-NO PAIN

## 2024-12-03 ENCOUNTER — APPOINTMENT (OUTPATIENT)
Dept: PRIMARY CARE | Facility: CLINIC | Age: 34
End: 2024-12-03
Payer: COMMERCIAL

## 2024-12-13 ENCOUNTER — OFFICE VISIT (OUTPATIENT)
Dept: PRIMARY CARE | Facility: CLINIC | Age: 34
End: 2024-12-13
Payer: COMMERCIAL

## 2024-12-13 VITALS
WEIGHT: 165 LBS | OXYGEN SATURATION: 96 % | HEART RATE: 81 BPM | SYSTOLIC BLOOD PRESSURE: 130 MMHG | BODY MASS INDEX: 25.84 KG/M2 | DIASTOLIC BLOOD PRESSURE: 78 MMHG

## 2024-12-13 DIAGNOSIS — F32.1 CURRENT MODERATE EPISODE OF MAJOR DEPRESSIVE DISORDER WITHOUT PRIOR EPISODE (MULTI): ICD-10-CM

## 2024-12-13 DIAGNOSIS — L71.9 ROSACEA: Primary | ICD-10-CM

## 2024-12-13 DIAGNOSIS — F41.9 ANXIETY: ICD-10-CM

## 2024-12-13 PROCEDURE — 99213 OFFICE O/P EST LOW 20 MIN: CPT | Performed by: STUDENT IN AN ORGANIZED HEALTH CARE EDUCATION/TRAINING PROGRAM

## 2024-12-13 RX ORDER — ESCITALOPRAM OXALATE 5 MG/1
5 TABLET ORAL DAILY
Qty: 90 TABLET | Refills: 1 | Status: SHIPPED | OUTPATIENT
Start: 2024-12-13 | End: 2025-06-11

## 2024-12-13 RX ORDER — METRONIDAZOLE 7.5 MG/G
CREAM TOPICAL 2 TIMES DAILY
Qty: 45 G | Refills: 2 | Status: SHIPPED | OUTPATIENT
Start: 2024-12-13 | End: 2025-12-13

## 2024-12-13 ASSESSMENT — PAIN SCALES - GENERAL: PAINLEVEL_OUTOF10: 0-NO PAIN

## 2024-12-13 NOTE — PROGRESS NOTES
Subjective   Patient ID: Peter Masterson is a 34 y.o. male who presents for Med Management (Lexapro. Doing really well and has seen a lot of results ).    Here today to follow up regarding depression and anxiety.  Has been taking lexapro 5 mg.  Not having any adverse effects from this.  States he is feeling dramatically better.  Medication helped with his anxiety and the depression has been less severe since he has been in control of the anxiety.     Still having intermittent reddening of face.  Will have redness and appearance of tielangectasias.  Brought pictures for reference.         Review of Systems   Constitutional:  Negative for chills, fatigue and fever.   HENT:  Negative for congestion, hearing loss, rhinorrhea, sinus pressure, sinus pain and tinnitus.    Eyes:  Negative for visual disturbance.   Respiratory:  Negative for cough, shortness of breath and wheezing.    Cardiovascular:  Negative for chest pain, palpitations and leg swelling.   Gastrointestinal:  Negative for constipation, diarrhea, nausea and vomiting.   Endocrine: Negative for cold intolerance, heat intolerance, polydipsia and polyuria.   Genitourinary:  Negative for dysuria, frequency and urgency.   Musculoskeletal:  Negative for arthralgias and myalgias.   Skin:  Positive for rash. Negative for pallor and wound.   Neurological:  Negative for dizziness, light-headedness and headaches.   Psychiatric/Behavioral:  Negative for dysphoric mood, sleep disturbance and suicidal ideas. The patient is not nervous/anxious.        Objective     Visit Vitals  /78 (BP Location: Left arm)   Pulse 81   Wt 74.8 kg (165 lb)   SpO2 96%   BMI 25.84 kg/m²   Smoking Status Never   BSA 1.88 m²         Physical Exam  Constitutional:       Appearance: Normal appearance.   HENT:      Head: Normocephalic and atraumatic.      Right Ear: Tympanic membrane, ear canal and external ear normal.      Left Ear: Tympanic membrane, ear canal and external ear normal.       Nose: Nose normal.      Mouth/Throat:      Mouth: Mucous membranes are moist.      Pharynx: Oropharynx is clear.   Eyes:      Extraocular Movements: Extraocular movements intact.      Conjunctiva/sclera: Conjunctivae normal.      Pupils: Pupils are equal, round, and reactive to light.   Cardiovascular:      Rate and Rhythm: Normal rate and regular rhythm.      Pulses: Normal pulses.      Heart sounds: Normal heart sounds.   Pulmonary:      Effort: Pulmonary effort is normal.      Breath sounds: Normal breath sounds.   Abdominal:      General: There is no distension.      Palpations: Abdomen is soft.      Tenderness: There is no abdominal tenderness.   Musculoskeletal:         General: Normal range of motion.   Skin:     General: Skin is warm and dry.      Findings: Erythema (There is mild erythema of the nose and malar prominences with some underlying vascular prominence of the nose consistent with rosacea.) present.   Neurological:      Mental Status: He is alert and oriented to person, place, and time. Mental status is at baseline.   Psychiatric:         Mood and Affect: Mood normal.         Behavior: Behavior normal.         Assessment & Plan  Anxiety    Orders:    escitalopram (Lexapro) 5 mg tablet; Take 1 tablet (5 mg) by mouth once daily.  Doing well, continue same and follow up in 6 months to see if he will continue this or trial discontinuation of meds.   Current moderate episode of major depressive disorder without prior episode (Multi)    Orders:    escitalopram (Lexapro) 5 mg tablet; Take 1 tablet (5 mg) by mouth once daily.    Rosacea    Orders:    metroNIDAZOLE (Metrocream) 0.75 % cream; Apply topically 2 times a day. Apply twice daily to rosacea  Pictures appear consistent with rosacea.  Patient with appearance of mild flare of rosacea in office today.  Insurance does not cover non-antimicrobial options, so will trial topical metronidazole at this time.      Reviewed and approved by TONY BERNARDO  12/14/24 at 9:02 AM.

## 2024-12-14 ASSESSMENT — ENCOUNTER SYMPTOMS
COUGH: 0
SLEEP DISTURBANCE: 0
CONSTIPATION: 0
MYALGIAS: 0
FATIGUE: 0
NERVOUS/ANXIOUS: 0
PALPITATIONS: 0
WHEEZING: 0
LIGHT-HEADEDNESS: 0
NAUSEA: 0
DIZZINESS: 0
DIARRHEA: 0
SHORTNESS OF BREATH: 0
DYSPHORIC MOOD: 0
FREQUENCY: 0
SINUS PRESSURE: 0
RHINORRHEA: 0
WOUND: 0
CHILLS: 0
ARTHRALGIAS: 0
SINUS PAIN: 0
VOMITING: 0
HEADACHES: 0
DYSURIA: 0
FEVER: 0
POLYDIPSIA: 0

## 2025-06-13 ENCOUNTER — TELEPHONE (OUTPATIENT)
Dept: PRIMARY CARE | Facility: CLINIC | Age: 35
End: 2025-06-13

## 2025-06-13 ENCOUNTER — APPOINTMENT (OUTPATIENT)
Dept: PRIMARY CARE | Facility: CLINIC | Age: 35
End: 2025-06-13
Payer: COMMERCIAL

## 2025-06-13 VITALS
DIASTOLIC BLOOD PRESSURE: 80 MMHG | HEART RATE: 96 BPM | SYSTOLIC BLOOD PRESSURE: 126 MMHG | WEIGHT: 179 LBS | OXYGEN SATURATION: 97 % | BODY MASS INDEX: 28.09 KG/M2 | HEIGHT: 67 IN

## 2025-06-13 DIAGNOSIS — F41.9 ANXIETY: ICD-10-CM

## 2025-06-13 DIAGNOSIS — F32.1 CURRENT MODERATE EPISODE OF MAJOR DEPRESSIVE DISORDER WITHOUT PRIOR EPISODE (MULTI): ICD-10-CM

## 2025-06-13 DIAGNOSIS — G47.26 SHIFT WORK SLEEP DISORDER: Primary | ICD-10-CM

## 2025-06-13 DIAGNOSIS — Z13.220 LIPID SCREENING: ICD-10-CM

## 2025-06-13 PROCEDURE — 99214 OFFICE O/P EST MOD 30 MIN: CPT | Performed by: STUDENT IN AN ORGANIZED HEALTH CARE EDUCATION/TRAINING PROGRAM

## 2025-06-13 PROCEDURE — 3008F BODY MASS INDEX DOCD: CPT | Performed by: STUDENT IN AN ORGANIZED HEALTH CARE EDUCATION/TRAINING PROGRAM

## 2025-06-13 PROCEDURE — 1036F TOBACCO NON-USER: CPT | Performed by: STUDENT IN AN ORGANIZED HEALTH CARE EDUCATION/TRAINING PROGRAM

## 2025-06-13 RX ORDER — TRAZODONE HYDROCHLORIDE 50 MG/1
25-50 TABLET ORAL NIGHTLY PRN
Qty: 30 TABLET | Refills: 0 | Status: SHIPPED | OUTPATIENT
Start: 2025-06-13 | End: 2025-07-13

## 2025-06-13 RX ORDER — ESCITALOPRAM OXALATE 5 MG/1
5 TABLET ORAL DAILY
Qty: 90 TABLET | Refills: 0 | Status: SHIPPED | OUTPATIENT
Start: 2025-06-13 | End: 2025-09-11

## 2025-06-13 ASSESSMENT — PATIENT HEALTH QUESTIONNAIRE - PHQ9
1. LITTLE INTEREST OR PLEASURE IN DOING THINGS: SEVERAL DAYS
7. TROUBLE CONCENTRATING ON THINGS, SUCH AS READING THE NEWSPAPER OR WATCHING TELEVISION: SEVERAL DAYS
4. FEELING TIRED OR HAVING LITTLE ENERGY: SEVERAL DAYS
2. FEELING DOWN, DEPRESSED OR HOPELESS: NOT AT ALL
3. TROUBLE FALLING OR STAYING ASLEEP OR SLEEPING TOO MUCH: MORE THAN HALF THE DAYS
8. MOVING OR SPEAKING SO SLOWLY THAT OTHER PEOPLE COULD HAVE NOTICED. OR THE OPPOSITE, BEING SO FIGETY OR RESTLESS THAT YOU HAVE BEEN MOVING AROUND A LOT MORE THAN USUAL: NOT AT ALL
SUM OF ALL RESPONSES TO PHQ QUESTIONS 1-9: 5
6. FEELING BAD ABOUT YOURSELF - OR THAT YOU ARE A FAILURE OR HAVE LET YOURSELF OR YOUR FAMILY DOWN: NOT AT ALL
9. THOUGHTS THAT YOU WOULD BE BETTER OFF DEAD, OR OF HURTING YOURSELF: NOT AT ALL
SUM OF ALL RESPONSES TO PHQ9 QUESTIONS 1 AND 2: 1
5. POOR APPETITE OR OVEREATING: NOT AT ALL

## 2025-06-13 ASSESSMENT — ENCOUNTER SYMPTOMS
DIZZINESS: 0
LIGHT-HEADEDNESS: 0
WHEEZING: 0
COUGH: 0
WOUND: 0
SLEEP DISTURBANCE: 1
RHINORRHEA: 0
NAUSEA: 0
MYALGIAS: 0
SINUS PRESSURE: 0
FEVER: 0
CONSTIPATION: 0
DYSPHORIC MOOD: 0
CHILLS: 0
SHORTNESS OF BREATH: 0
HEADACHES: 0
VOMITING: 0
DYSURIA: 0
NERVOUS/ANXIOUS: 0
FREQUENCY: 0
POLYDIPSIA: 0
SINUS PAIN: 0
PALPITATIONS: 0
DIARRHEA: 0
FATIGUE: 0
ARTHRALGIAS: 0

## 2025-06-13 ASSESSMENT — ANXIETY QUESTIONNAIRES
3. WORRYING TOO MUCH ABOUT DIFFERENT THINGS: NOT AT ALL
2. NOT BEING ABLE TO STOP OR CONTROL WORRYING: NOT AT ALL
GAD7 TOTAL SCORE: 2
4. TROUBLE RELAXING: SEVERAL DAYS
IF YOU CHECKED OFF ANY PROBLEMS ON THIS QUESTIONNAIRE, HOW DIFFICULT HAVE THESE PROBLEMS MADE IT FOR YOU TO DO YOUR WORK, TAKE CARE OF THINGS AT HOME, OR GET ALONG WITH OTHER PEOPLE: NOT DIFFICULT AT ALL
5. BEING SO RESTLESS THAT IT IS HARD TO SIT STILL: SEVERAL DAYS
6. BECOMING EASILY ANNOYED OR IRRITABLE: NOT AT ALL
7. FEELING AFRAID AS IF SOMETHING AWFUL MIGHT HAPPEN: NOT AT ALL
1. FEELING NERVOUS, ANXIOUS, OR ON EDGE: NOT AT ALL

## 2025-06-13 ASSESSMENT — PAIN SCALES - GENERAL: PAINLEVEL_OUTOF10: 0-NO PAIN

## 2025-06-13 ASSESSMENT — COLUMBIA-SUICIDE SEVERITY RATING SCALE - C-SSRS: 1. IN THE PAST MONTH, HAVE YOU WISHED YOU WERE DEAD OR WISHED YOU COULD GO TO SLEEP AND NOT WAKE UP?: NO

## 2025-06-13 NOTE — PROGRESS NOTES
"Subjective   Patient ID: Peter Masterson is a 34 y.o. male who presents for Anxiety.    Here today to follow up regarding anxiety.  Has responded extremely well to escitalopram.  Wondering about duration of therapy prior to discontinuation.     Has had difficulty with sleep.  Is able to go to sleep initially.   Wakes up around 3-4 hours later.   Stays awake 1-2 hours.  Goes back to sleep for additional 2 hours.   Takes melatonin to help get to sleep.   Sometimes takes OTC Unisom.   Does not drink alcohol regularly.    Does drink quite a bit of caffeine.  Drinks energy drinks and coffee.  Large amounts due to working night shift.  Works 3rd shift.   Has blackout curtains. Turns all lights off.        Anxiety  Patient reports no chest pain, dizziness, nausea, nervous/anxious behavior, palpitations or shortness of breath.           Review of Systems   Constitutional:  Negative for chills, fatigue and fever.   HENT:  Negative for congestion, hearing loss, rhinorrhea, sinus pressure, sinus pain and tinnitus.    Eyes:  Negative for visual disturbance.   Respiratory:  Negative for cough, shortness of breath and wheezing.    Cardiovascular:  Negative for chest pain, palpitations and leg swelling.   Gastrointestinal:  Negative for constipation, diarrhea, nausea and vomiting.   Endocrine: Negative for cold intolerance, heat intolerance, polydipsia and polyuria.   Genitourinary:  Negative for dysuria, frequency and urgency.   Musculoskeletal:  Negative for arthralgias and myalgias.   Skin:  Negative for pallor, rash and wound.   Neurological:  Negative for dizziness, light-headedness and headaches.   Psychiatric/Behavioral:  Positive for sleep disturbance. Negative for dysphoric mood. The patient is not nervous/anxious.        Objective     Visit Vitals  /80   Pulse 96   Ht 1.702 m (5' 7\")   Wt 81.2 kg (179 lb)   SpO2 97%   BMI 28.04 kg/m²   Smoking Status Never   BSA 1.96 m²   MARA-7 Total Score: 2 (6/13/2025  8:26 " AM)  Patient Health Questionnaire-9 Score: 5        Physical Exam  Constitutional:       Appearance: Normal appearance.   HENT:      Head: Normocephalic and atraumatic.      Right Ear: External ear normal.      Left Ear: External ear normal.   Eyes:      Conjunctiva/sclera: Conjunctivae normal.   Cardiovascular:      Rate and Rhythm: Normal rate and regular rhythm.      Heart sounds: Normal heart sounds.   Pulmonary:      Effort: Pulmonary effort is normal.      Breath sounds: Normal breath sounds.   Skin:     General: Skin is warm and dry.   Neurological:      Mental Status: He is alert.   Psychiatric:         Mood and Affect: Mood normal.         Behavior: Behavior normal.         Assessment & Plan  Anxiety    Orders:    escitalopram (Lexapro) 5 mg tablet; Take 1 tablet (5 mg) by mouth once daily.  Symptoms well-controlled at this time.  Patient would like to try going off of this in the future.  To continue for an additional 3 months.  Then discontinue.  This will be approximately one full year of medication therapy.  He can then follow-up in 6 months and assess how his symptoms are doing.  Current moderate episode of major depressive disorder without prior episode (Multi)    Orders:    escitalopram (Lexapro) 5 mg tablet; Take 1 tablet (5 mg) by mouth once daily.    Shift work sleep disorder    Orders:    traZODone (Desyrel) 50 mg tablet; Take 0.5-1 tablets (25-50 mg) by mouth as needed at bedtime for sleep.  We discussed that there is a component of circadian rhythm issue at work here since he works third shift.  This may interfere with the efficacy of melatonin or ramelteon.  Will trial trazodone.  Advised that this can sometimes lead to grogginess so would try it on a nonworking day first.  If not effective there are number of other options available.     Reviewed and approved by TONY BERNARDO on 6/13/25 at 12:56 PM.

## 2025-12-19 ENCOUNTER — APPOINTMENT (OUTPATIENT)
Dept: PRIMARY CARE | Facility: CLINIC | Age: 35
End: 2025-12-19
Payer: COMMERCIAL